# Patient Record
Sex: FEMALE | Race: WHITE | NOT HISPANIC OR LATINO | Employment: OTHER | URBAN - METROPOLITAN AREA
[De-identification: names, ages, dates, MRNs, and addresses within clinical notes are randomized per-mention and may not be internally consistent; named-entity substitution may affect disease eponyms.]

---

## 2017-10-04 ENCOUNTER — ESTABLISHED COMPREHENSIVE EXAM (OUTPATIENT)
Dept: URBAN - METROPOLITAN AREA CLINIC 43 | Facility: CLINIC | Age: 72
End: 2017-10-04

## 2017-10-04 DIAGNOSIS — H43.813: ICD-10-CM

## 2017-10-04 DIAGNOSIS — H04.123: ICD-10-CM

## 2017-10-04 PROCEDURE — 1036F TOBACCO NON-USER: CPT

## 2017-10-04 PROCEDURE — G8785 BP SCRN NO PERF AT INTERVAL: HCPCS

## 2017-10-04 PROCEDURE — G8427 DOCREV CUR MEDS BY ELIG CLIN: HCPCS

## 2017-10-04 PROCEDURE — 92014 COMPRE OPH EXAM EST PT 1/>: CPT

## 2017-10-04 PROCEDURE — 83861 MICROFLUID ANALY TEARS: CPT

## 2017-10-04 ASSESSMENT — VISUAL ACUITY
OD_CC: J2-
OS_BAT: 20/40
OS_CC: 20/30-1
OS_CC: J1
OD_SC: J12
OD_CC: 20/40-2
OS_SC: J12
OD_SC: 20/70-2
OD_BAT: 20/50
OS_SC: 20/60

## 2017-10-04 ASSESSMENT — TONOMETRY
OD_IOP_MMHG: 13
OS_IOP_MMHG: 13

## 2018-10-03 ENCOUNTER — ESTABLISHED COMPREHENSIVE EXAM (OUTPATIENT)
Dept: URBAN - METROPOLITAN AREA CLINIC 43 | Facility: CLINIC | Age: 73
End: 2018-10-03

## 2018-10-03 DIAGNOSIS — H04.123: ICD-10-CM

## 2018-10-03 DIAGNOSIS — H25.9: ICD-10-CM

## 2018-10-03 DIAGNOSIS — H43.813: ICD-10-CM

## 2018-10-03 PROCEDURE — 1036F TOBACCO NON-USER: CPT

## 2018-10-03 PROCEDURE — G9903 PT SCRN TBCO ID AS NON USER: HCPCS

## 2018-10-03 PROCEDURE — G8428 CUR MEDS NOT DOCUMENT: HCPCS

## 2018-10-03 PROCEDURE — 92014 COMPRE OPH EXAM EST PT 1/>: CPT

## 2018-10-03 PROCEDURE — G8785 BP SCRN NO PERF AT INTERVAL: HCPCS

## 2018-10-03 ASSESSMENT — TONOMETRY
OS_IOP_MMHG: 14
OD_IOP_MMHG: 13

## 2018-10-03 ASSESSMENT — VISUAL ACUITY
OD_CC: 20/50
OD_BAT: 20/200
OS_BAT: 20/200
OD_SC: 20/50-1
OS_CC: 20/40-1
OS_SC: 20/50+2

## 2019-10-03 ENCOUNTER — ESTABLISHED COMPREHENSIVE EXAM (OUTPATIENT)
Dept: URBAN - METROPOLITAN AREA CLINIC 43 | Facility: CLINIC | Age: 74
End: 2019-10-03

## 2019-10-03 DIAGNOSIS — H43.813: ICD-10-CM

## 2019-10-03 DIAGNOSIS — H04.123: ICD-10-CM

## 2019-10-03 DIAGNOSIS — H25.9: ICD-10-CM

## 2019-10-03 PROCEDURE — 92014 COMPRE OPH EXAM EST PT 1/>: CPT

## 2019-10-03 ASSESSMENT — VISUAL ACUITY
OS_CC: J1
OD_CC: J1
OD_CC: 20/50+1
OS_CC: 20/30-2

## 2019-10-03 ASSESSMENT — TONOMETRY
OD_IOP_MMHG: 12
OS_IOP_MMHG: 13

## 2020-10-06 ENCOUNTER — ESTABLISHED COMPREHENSIVE EXAM (OUTPATIENT)
Dept: URBAN - METROPOLITAN AREA CLINIC 43 | Facility: CLINIC | Age: 75
End: 2020-10-06

## 2020-10-06 DIAGNOSIS — H43.813: ICD-10-CM

## 2020-10-06 DIAGNOSIS — H25.9: ICD-10-CM

## 2020-10-06 DIAGNOSIS — H18.593: ICD-10-CM

## 2020-10-06 DIAGNOSIS — H04.123: ICD-10-CM

## 2020-10-06 PROCEDURE — 92015 DETERMINE REFRACTIVE STATE: CPT

## 2020-10-06 PROCEDURE — 92014 COMPRE OPH EXAM EST PT 1/>: CPT

## 2020-10-06 ASSESSMENT — VISUAL ACUITY
OD_SC: J10
OS_CC: 20/40
OS_SC: 20/50-2
OD_SC: 20/70
OS_SC: J12
OD_CC: 20/70
OS_BAT: 20/80-2
OD_PH: 20/40+1
OD_BAT: 20/400
OD_CC: J1
OS_CC: J1

## 2020-10-06 ASSESSMENT — TONOMETRY
OD_IOP_MMHG: 12
OS_IOP_MMHG: 13

## 2020-11-03 ENCOUNTER — CATARACT CONSULT (OUTPATIENT)
Dept: URBAN - METROPOLITAN AREA CLINIC 43 | Facility: CLINIC | Age: 75
End: 2020-11-03

## 2020-11-03 DIAGNOSIS — H18.593: ICD-10-CM

## 2020-11-03 DIAGNOSIS — H18.513: ICD-10-CM

## 2020-11-03 DIAGNOSIS — H04.123: ICD-10-CM

## 2020-11-03 DIAGNOSIS — H25.812: ICD-10-CM

## 2020-11-03 DIAGNOSIS — H25.811: ICD-10-CM

## 2020-11-03 DIAGNOSIS — H43.813: ICD-10-CM

## 2020-11-03 PROCEDURE — 92014 COMPRE OPH EXAM EST PT 1/>: CPT

## 2020-11-03 PROCEDURE — 92286 ANT SGM IMG I&R SPECLR MIC: CPT

## 2020-11-03 PROCEDURE — 92134 CPTRZ OPH DX IMG PST SGM RTA: CPT

## 2020-11-03 PROCEDURE — 92025-2 CORNEAL TOPOGRAPHY, PT

## 2020-11-03 PROCEDURE — 92136TC INTERFEROMETRY - TECHNICAL COMPONENT

## 2020-11-03 ASSESSMENT — VISUAL ACUITY
OD_SC: J10
OD_RAM: 20/20
OS_SC: 20/60-2
OD_CC: 20/60-2
OD_CC: J6
OS_BAT: 20/100
OS_AM: 20/20
OS_CC: 20/40-2
OD_BAT: 20/400
OD_SC: 20/80-1
OS_CC: J2
OS_SC: J12

## 2020-11-03 ASSESSMENT — TONOMETRY
OD_IOP_MMHG: 11
OS_IOP_MMHG: 12

## 2020-11-09 ENCOUNTER — CORNEA CONSULT (OUTPATIENT)
Dept: URBAN - METROPOLITAN AREA CLINIC 43 | Facility: CLINIC | Age: 75
End: 2020-11-09

## 2020-11-09 DIAGNOSIS — H18.593: ICD-10-CM

## 2020-11-09 DIAGNOSIS — H43.813: ICD-10-CM

## 2020-11-09 DIAGNOSIS — H25.811: ICD-10-CM

## 2020-11-09 DIAGNOSIS — H35.413: ICD-10-CM

## 2020-11-09 DIAGNOSIS — H04.123: ICD-10-CM

## 2020-11-09 DIAGNOSIS — H18.513: ICD-10-CM

## 2020-11-09 DIAGNOSIS — H25.812: ICD-10-CM

## 2020-11-09 PROCEDURE — 92250 FUNDUS PHOTOGRAPHY W/I&R: CPT

## 2020-11-09 PROCEDURE — 92014 COMPRE OPH EXAM EST PT 1/>: CPT

## 2020-11-09 PROCEDURE — 92025 CPTRIZED CORNEAL TOPOGRAPHY: CPT

## 2020-11-09 RX ORDER — AMOXICILLIN 500 MG/1: 1 CAPSULE ORAL

## 2020-11-09 ASSESSMENT — TONOMETRY
OD_IOP_MMHG: 12
OS_IOP_MMHG: 15

## 2020-11-09 ASSESSMENT — VISUAL ACUITY
OD_SC: 20/80-1
OD_CC: 20/50
OS_CC: 20/40-1
OS_SC: 20/60-1
OS_SC: J12
OD_CC: J2
OS_CC: J1
OD_SC: J10

## 2020-11-18 ENCOUNTER — SURGERY/PROCEDURE (OUTPATIENT)
Dept: URBAN - METROPOLITAN AREA CLINIC 43 | Facility: CLINIC | Age: 75
End: 2020-11-18

## 2020-11-18 ENCOUNTER — ESTABLISHED PATIENT (OUTPATIENT)
Dept: URBAN - METROPOLITAN AREA SURGERY 14 | Facility: SURGERY | Age: 75
End: 2020-11-18

## 2020-11-18 DIAGNOSIS — H18.593: ICD-10-CM

## 2020-11-18 DIAGNOSIS — H18.591: ICD-10-CM

## 2020-11-18 PROCEDURE — 65400 REMOVAL OF EYE LESION: CPT

## 2020-11-18 PROCEDURE — 99211HP H&P OFFICE/OUTPATIENT VISIT, EST

## 2020-11-18 PROCEDURE — 65435 CURETTE/TREAT CORNEA: CPT

## 2020-11-19 ENCOUNTER — 1 DAY POST-OP (OUTPATIENT)
Dept: URBAN - METROPOLITAN AREA CLINIC 43 | Facility: CLINIC | Age: 75
End: 2020-11-19

## 2020-11-19 DIAGNOSIS — Z98.890: ICD-10-CM

## 2020-11-19 DIAGNOSIS — H18.593: ICD-10-CM

## 2020-11-19 PROCEDURE — 66999PO NON CO-MANAGED OTHER SURGERY PO

## 2020-11-19 ASSESSMENT — VISUAL ACUITY
OD_SC: 20/200+1
OS_SC: 20/60-1

## 2020-11-24 ENCOUNTER — POST-OP (OUTPATIENT)
Dept: URBAN - METROPOLITAN AREA CLINIC 43 | Facility: CLINIC | Age: 75
End: 2020-11-24

## 2020-11-24 DIAGNOSIS — Z98.890: ICD-10-CM

## 2020-11-24 DIAGNOSIS — H18.593: ICD-10-CM

## 2020-11-24 PROCEDURE — 66999PO NON CO-MANAGED OTHER SURGERY PO

## 2020-11-24 ASSESSMENT — VISUAL ACUITY
OS_SC: 20/60-1
OD_SC: 20/200

## 2020-12-01 ENCOUNTER — FOLLOW UP (OUTPATIENT)
Dept: URBAN - METROPOLITAN AREA CLINIC 43 | Facility: CLINIC | Age: 75
End: 2020-12-01

## 2020-12-01 DIAGNOSIS — Z98.890: ICD-10-CM

## 2020-12-01 DIAGNOSIS — H18.593: ICD-10-CM

## 2020-12-01 PROCEDURE — 66999PO NON CO-MANAGED OTHER SURGERY PO

## 2020-12-01 ASSESSMENT — VISUAL ACUITY
OS_SC: 20/60+2
OD_SC: 20/200

## 2020-12-07 ENCOUNTER — FOLLOW UP (OUTPATIENT)
Dept: URBAN - METROPOLITAN AREA CLINIC 43 | Facility: CLINIC | Age: 75
End: 2020-12-07

## 2020-12-07 DIAGNOSIS — H18.593: ICD-10-CM

## 2020-12-07 DIAGNOSIS — Z98.890: ICD-10-CM

## 2020-12-07 PROCEDURE — 66999PO NON CO-MANAGED OTHER SURGERY PO

## 2020-12-07 ASSESSMENT — VISUAL ACUITY
OS_SC: 20/50-1
OD_SC: 20/100-1

## 2020-12-14 ENCOUNTER — POST-OP (OUTPATIENT)
Dept: URBAN - METROPOLITAN AREA CLINIC 43 | Facility: CLINIC | Age: 75
End: 2020-12-14

## 2020-12-14 DIAGNOSIS — Z98.890: ICD-10-CM

## 2020-12-14 DIAGNOSIS — H18.593: ICD-10-CM

## 2020-12-14 PROCEDURE — 66999PO NON CO-MANAGED OTHER SURGERY PO

## 2020-12-14 ASSESSMENT — VISUAL ACUITY
OS_SC: 20/50-2
OD_SC: 20/100+1

## 2021-01-04 ENCOUNTER — POST-OP (OUTPATIENT)
Dept: URBAN - METROPOLITAN AREA CLINIC 43 | Facility: CLINIC | Age: 76
End: 2021-01-04

## 2021-01-04 DIAGNOSIS — H18.593: ICD-10-CM

## 2021-01-04 DIAGNOSIS — Z98.890: ICD-10-CM

## 2021-01-04 PROCEDURE — 66999PO NON CO-MANAGED OTHER SURGERY PO

## 2021-01-04 ASSESSMENT — VISUAL ACUITY
OS_SC: 20/50-2
OD_SC: 20/100+1

## 2021-01-28 ENCOUNTER — ESTABLISHED PATIENT (OUTPATIENT)
Dept: URBAN - METROPOLITAN AREA SURGERY 14 | Facility: SURGERY | Age: 76
End: 2021-01-28

## 2021-01-28 ENCOUNTER — SURGERY/PROCEDURE (OUTPATIENT)
Dept: URBAN - METROPOLITAN AREA CLINIC 43 | Facility: CLINIC | Age: 76
End: 2021-01-28

## 2021-01-28 DIAGNOSIS — H18.593: ICD-10-CM

## 2021-01-28 DIAGNOSIS — H18.592: ICD-10-CM

## 2021-01-28 DIAGNOSIS — Z98.890: ICD-10-CM

## 2021-01-28 PROCEDURE — 65435 CURETTE/TREAT CORNEA: CPT

## 2021-01-28 PROCEDURE — 99211HP H&P OFFICE/OUTPATIENT VISIT, EST

## 2021-01-28 PROCEDURE — 65400 REMOVAL OF EYE LESION: CPT

## 2021-01-29 ENCOUNTER — POST-OP (OUTPATIENT)
Dept: URBAN - METROPOLITAN AREA CLINIC 43 | Facility: CLINIC | Age: 76
End: 2021-01-29

## 2021-01-29 DIAGNOSIS — H18.593: ICD-10-CM

## 2021-01-29 DIAGNOSIS — Z98.890: ICD-10-CM

## 2021-01-29 PROCEDURE — 66999PO NON CO-MANAGED OTHER SURGERY PO

## 2021-01-29 ASSESSMENT — VISUAL ACUITY
OD_SC: 20/80-1
OS_SC: 20/60-1

## 2021-02-03 ENCOUNTER — POST-OP (OUTPATIENT)
Dept: URBAN - METROPOLITAN AREA CLINIC 43 | Facility: CLINIC | Age: 76
End: 2021-02-03

## 2021-02-03 DIAGNOSIS — H18.593: ICD-10-CM

## 2021-02-03 DIAGNOSIS — Z98.890: ICD-10-CM

## 2021-02-03 PROCEDURE — 99024 POSTOP FOLLOW-UP VISIT: CPT

## 2021-02-03 RX ORDER — PREDNISOLONE ACETATE 10 MG/ML: 1 SUSPENSION/ DROPS OPHTHALMIC TWICE A DAY

## 2021-02-03 ASSESSMENT — VISUAL ACUITY
OS_SC: 20/70-1
OD_SC: 20/80-1

## 2021-02-19 ENCOUNTER — POST-OP (OUTPATIENT)
Dept: URBAN - METROPOLITAN AREA CLINIC 43 | Facility: CLINIC | Age: 76
End: 2021-02-19

## 2021-02-19 DIAGNOSIS — H18.593: ICD-10-CM

## 2021-02-19 DIAGNOSIS — Z98.890: ICD-10-CM

## 2021-02-19 PROCEDURE — 66999PO NON CO-MANAGED OTHER SURGERY PO

## 2021-02-19 ASSESSMENT — VISUAL ACUITY
OS_SC: 20/60
OD_SC: 20/80-1
OD_CC: 20/50
OS_CC: 20/50

## 2021-05-04 ENCOUNTER — CATARACT EVALUATION (OUTPATIENT)
Dept: URBAN - METROPOLITAN AREA CLINIC 43 | Facility: CLINIC | Age: 76
End: 2021-05-04

## 2021-05-04 DIAGNOSIS — H25.812: ICD-10-CM

## 2021-05-04 DIAGNOSIS — H25.811: ICD-10-CM

## 2021-05-04 DIAGNOSIS — H18.593: ICD-10-CM

## 2021-05-04 DIAGNOSIS — Z98.890: ICD-10-CM

## 2021-05-04 DIAGNOSIS — H35.413: ICD-10-CM

## 2021-05-04 PROCEDURE — 92134 CPTRZ OPH DX IMG PST SGM RTA: CPT

## 2021-05-04 PROCEDURE — 92015 DETERMINE REFRACTIVE STATE: CPT

## 2021-05-04 PROCEDURE — 92025 CPTRIZED CORNEAL TOPOGRAPHY: CPT

## 2021-05-04 PROCEDURE — 99214 OFFICE O/P EST MOD 30 MIN: CPT

## 2021-05-04 ASSESSMENT — VISUAL ACUITY
OS_CC: 20/60+2
OS_CC: J1
OS_AM: 20/20-1
OS_BAT: 20/400
OD_CC: J2
OD_CC: 20/60-2
OD_RAM: 20/20-2
OD_SC: J12
OD_SC: 20/50-1
OS_SC: J12
OS_SC: 20/40
OD_BAT: 20/100

## 2021-05-04 ASSESSMENT — TONOMETRY
OD_IOP_MMHG: 13
OS_IOP_MMHG: 14

## 2021-05-10 ENCOUNTER — FOLLOW UP (OUTPATIENT)
Dept: URBAN - METROPOLITAN AREA CLINIC 43 | Facility: CLINIC | Age: 76
End: 2021-05-10

## 2021-05-10 DIAGNOSIS — H04.123: ICD-10-CM

## 2021-05-10 DIAGNOSIS — H18.593: ICD-10-CM

## 2021-05-10 PROCEDURE — A4262 TEMPORARY TEAR DUCT PLUG: HCPCS

## 2021-05-10 PROCEDURE — 68761Q PUNCTAL PLUG/QUINTESS DISSOLVABLE PLUG/EACH

## 2021-05-10 PROCEDURE — 92012 INTRM OPH EXAM EST PATIENT: CPT

## 2021-05-10 ASSESSMENT — VISUAL ACUITY
OD_CC: 20/50+1
OS_CC: 20/50

## 2021-06-04 NOTE — PATIENT DISCUSSION
Recommended warm compresses with microwavable eye mask for 10 minutes and take 3,000 mg flaxseed oil by mouth daily. Home

## 2021-06-08 ENCOUNTER — FOLLOW UP (OUTPATIENT)
Dept: URBAN - METROPOLITAN AREA CLINIC 43 | Facility: CLINIC | Age: 76
End: 2021-06-08

## 2021-06-08 DIAGNOSIS — H18.593: ICD-10-CM

## 2021-06-08 DIAGNOSIS — H04.123: ICD-10-CM

## 2021-06-08 DIAGNOSIS — H25.812: ICD-10-CM

## 2021-06-08 DIAGNOSIS — H25.811: ICD-10-CM

## 2021-06-08 DIAGNOSIS — H18.513: ICD-10-CM

## 2021-06-08 PROCEDURE — 99214 OFFICE O/P EST MOD 30 MIN: CPT

## 2021-06-08 PROCEDURE — 92025-1 CORNEAL TOPOGRAPHY, INS

## 2021-06-08 PROCEDURE — 92136TC INTERFEROMETRY - TECHNICAL COMPONENT

## 2021-06-08 RX ORDER — NEPAFENAC 3 MG/ML: 1 SUSPENSION/ DROPS OPHTHALMIC ONCE A DAY

## 2021-06-08 RX ORDER — AMOXICILLIN 500 MG/1: 1 CAPSULE ORAL

## 2021-06-08 RX ORDER — DUREZOL 0.5 MG/ML: 1 EMULSION OPHTHALMIC TWICE A DAY

## 2021-06-08 ASSESSMENT — TONOMETRY
OD_IOP_MMHG: 12
OS_IOP_MMHG: 14

## 2021-06-08 ASSESSMENT — VISUAL ACUITY
OD_SC: 20/70-1
OD_BAT: 20/70
OD_CC: J6
OD_SC: J12
OS_SC: 20/40
OS_SC: J12
OD_CC: 20/40
OS_BAT: 20/50
OS_CC: J1
OS_AM: 20/20
OD_RAM: 20/25
OS_CC: 20/40

## 2021-06-22 ENCOUNTER — SURGERY/PROCEDURE (OUTPATIENT)
Dept: URBAN - METROPOLITAN AREA CLINIC 43 | Facility: CLINIC | Age: 76
End: 2021-06-22

## 2021-06-22 ENCOUNTER — POST-OP CATARACT (OUTPATIENT)
Dept: URBAN - METROPOLITAN AREA CLINIC 39 | Facility: CLINIC | Age: 76
End: 2021-06-22

## 2021-06-22 ENCOUNTER — PRE-OP/H&P (OUTPATIENT)
Dept: URBAN - METROPOLITAN AREA CLINIC 39 | Facility: CLINIC | Age: 76
End: 2021-06-22

## 2021-06-22 DIAGNOSIS — Z96.1: ICD-10-CM

## 2021-06-22 DIAGNOSIS — H25.811: ICD-10-CM

## 2021-06-22 PROCEDURE — 66984CV REMOVE CATARACT, INSERT LENS, CUSTOM VISION

## 2021-06-22 PROCEDURE — 99211T TECH SERVICE

## 2021-06-22 PROCEDURE — 66999LNSR LENSAR LASER FOR CAT SX

## 2021-06-22 PROCEDURE — 65772LRI LRI DURING CAT SX

## 2021-06-22 ASSESSMENT — TONOMETRY: OD_IOP_MMHG: 08

## 2021-06-22 ASSESSMENT — VISUAL ACUITY: OD_SC: 20/200

## 2021-06-23 ENCOUNTER — POST OP/EVAL OF SECOND EYE (OUTPATIENT)
Dept: URBAN - METROPOLITAN AREA CLINIC 43 | Facility: CLINIC | Age: 76
End: 2021-06-23

## 2021-06-23 DIAGNOSIS — Z96.1: ICD-10-CM

## 2021-06-23 DIAGNOSIS — H25.812: ICD-10-CM

## 2021-06-23 PROCEDURE — P6698455 NON-COMANAGED ADVANCED PO

## 2021-06-23 ASSESSMENT — VISUAL ACUITY
OD_SC: 20/200
OS_BAT: 20/50
OD_SC: J12
OD_PH: 20/100
OS_SC: J10
OS_PH: 20/30-1
OS_SC: 20/40

## 2021-06-23 ASSESSMENT — TONOMETRY
OS_IOP_MMHG: 10
OD_IOP_MMHG: 10

## 2021-06-28 ENCOUNTER — POST OP/EVAL OF SECOND EYE (OUTPATIENT)
Dept: URBAN - METROPOLITAN AREA CLINIC 43 | Facility: CLINIC | Age: 76
End: 2021-06-28

## 2021-06-28 DIAGNOSIS — H25.812: ICD-10-CM

## 2021-06-28 DIAGNOSIS — H16.101: ICD-10-CM

## 2021-06-28 DIAGNOSIS — Z96.1: ICD-10-CM

## 2021-06-28 PROCEDURE — 99024 POSTOP FOLLOW-UP VISIT: CPT

## 2021-06-28 ASSESSMENT — VISUAL ACUITY
OD_SC: 20/70-1
OS_SC: 20/40

## 2021-07-01 ENCOUNTER — POST OP/EVAL OF SECOND EYE (OUTPATIENT)
Dept: URBAN - METROPOLITAN AREA CLINIC 43 | Facility: CLINIC | Age: 76
End: 2021-07-01

## 2021-07-01 DIAGNOSIS — Z96.1: ICD-10-CM

## 2021-07-01 DIAGNOSIS — H25.812: ICD-10-CM

## 2021-07-01 PROCEDURE — 99024 POSTOP FOLLOW-UP VISIT: CPT

## 2021-07-01 ASSESSMENT — VISUAL ACUITY
OD_SC: 20/200-1
OS_SC: 20/40-1

## 2021-07-01 ASSESSMENT — TONOMETRY
OD_IOP_MMHG: 11
OS_IOP_MMHG: 10

## 2021-07-08 ENCOUNTER — POST-OP (OUTPATIENT)
Dept: URBAN - METROPOLITAN AREA CLINIC 43 | Facility: CLINIC | Age: 76
End: 2021-07-08

## 2021-07-08 DIAGNOSIS — Z96.1: ICD-10-CM

## 2021-07-08 DIAGNOSIS — H25.812: ICD-10-CM

## 2021-07-08 PROCEDURE — 99024 POSTOP FOLLOW-UP VISIT: CPT

## 2021-07-08 ASSESSMENT — TONOMETRY
OS_IOP_MMHG: 13
OD_IOP_MMHG: 13

## 2021-07-08 ASSESSMENT — VISUAL ACUITY
OS_SC: J8-
OD_SC: 20/80-1
OS_BAT: 20/50
OD_SC: J3-
OS_SC: 20/40-1

## 2021-07-26 ENCOUNTER — POST-OP CATARACT (OUTPATIENT)
Dept: URBAN - METROPOLITAN AREA CLINIC 43 | Facility: CLINIC | Age: 76
End: 2021-07-26

## 2021-07-26 DIAGNOSIS — H25.812: ICD-10-CM

## 2021-07-26 DIAGNOSIS — Z96.1: ICD-10-CM

## 2021-07-26 PROCEDURE — 99024 POSTOP FOLLOW-UP VISIT: CPT

## 2021-07-26 ASSESSMENT — VISUAL ACUITY
OD_SC: J6-
OS_SC: J12
OS_SC: 20/40-1
OS_BAT: 20/50
OD_SC: 20/60

## 2021-07-26 ASSESSMENT — TONOMETRY
OD_IOP_MMHG: 13
OS_IOP_MMHG: 13

## 2021-08-26 ENCOUNTER — POST-OP (OUTPATIENT)
Dept: URBAN - METROPOLITAN AREA CLINIC 43 | Facility: CLINIC | Age: 76
End: 2021-08-26

## 2021-08-26 DIAGNOSIS — H04.123: ICD-10-CM

## 2021-08-26 DIAGNOSIS — Z96.1: ICD-10-CM

## 2021-08-26 PROCEDURE — 68761Q PUNCTAL PLUG/QUINTESS DISSOLVABLE PLUG/EACH

## 2021-08-26 PROCEDURE — 99024 POSTOP FOLLOW-UP VISIT: CPT

## 2021-08-26 PROCEDURE — A4262 TEMPORARY TEAR DUCT PLUG: HCPCS

## 2021-08-26 ASSESSMENT — TONOMETRY
OS_IOP_MMHG: 13
OD_IOP_MMHG: 10

## 2021-08-26 ASSESSMENT — VISUAL ACUITY
OD_SC: J4-
OS_SC: 20/40
OD_PH: 20/50-2
OS_SC: J10
OD_SC: 20/80+1

## 2021-09-23 ENCOUNTER — YAG EVALUATION (OUTPATIENT)
Dept: URBAN - METROPOLITAN AREA CLINIC 39 | Facility: CLINIC | Age: 76
End: 2021-09-23

## 2021-09-23 ENCOUNTER — SURGERY/PROCEDURE (OUTPATIENT)
Dept: URBAN - METROPOLITAN AREA SURGERY 14 | Facility: SURGERY | Age: 76
End: 2021-09-23

## 2021-09-23 DIAGNOSIS — H26.491: ICD-10-CM

## 2021-09-23 DIAGNOSIS — Z96.1: ICD-10-CM

## 2021-09-23 DIAGNOSIS — H25.812: ICD-10-CM

## 2021-09-23 PROCEDURE — 92014 COMPRE OPH EXAM EST PT 1/>: CPT

## 2021-09-23 PROCEDURE — 66821 AFTER CATARACT LASER SURGERY: CPT

## 2021-09-23 ASSESSMENT — VISUAL ACUITY
OS_SC: 20/40
OD_SC: 20/60

## 2021-09-27 ENCOUNTER — YAG POST-OP (OUTPATIENT)
Dept: URBAN - METROPOLITAN AREA CLINIC 43 | Facility: CLINIC | Age: 76
End: 2021-09-27

## 2021-09-27 DIAGNOSIS — Z98.890: ICD-10-CM

## 2021-09-27 DIAGNOSIS — H04.123: ICD-10-CM

## 2021-09-27 PROCEDURE — 68761S PUNCTUM PLUG / SILICONE,EACH

## 2021-09-27 PROCEDURE — 99024 POSTOP FOLLOW-UP VISIT: CPT

## 2021-09-27 PROCEDURE — A4263 PERMANENT TEAR DUCT PLUG: HCPCS

## 2021-09-27 ASSESSMENT — TONOMETRY
OD_IOP_MMHG: 10
OS_IOP_MMHG: 12

## 2021-09-27 ASSESSMENT — VISUAL ACUITY
OS_SC: 20/40
OD_SC: 20/80

## 2021-11-01 ENCOUNTER — FOLLOW UP (OUTPATIENT)
Dept: URBAN - METROPOLITAN AREA CLINIC 43 | Facility: CLINIC | Age: 76
End: 2021-11-01

## 2021-11-01 DIAGNOSIS — Z98.890: ICD-10-CM

## 2021-11-01 PROCEDURE — 66999PO NON CO-MANAGED OTHER SURGERY PO

## 2021-11-01 ASSESSMENT — VISUAL ACUITY
OD_SC: J2-
OD_SC: 20/50-1
OS_SC: J10
OS_SC: 20/40

## 2021-11-01 ASSESSMENT — TONOMETRY
OD_IOP_MMHG: 11
OS_IOP_MMHG: 14

## 2022-01-13 ENCOUNTER — COMPREHENSIVE EXAM (OUTPATIENT)
Dept: URBAN - METROPOLITAN AREA CLINIC 43 | Facility: CLINIC | Age: 77
End: 2022-01-13

## 2022-01-13 DIAGNOSIS — H04.123: ICD-10-CM

## 2022-01-13 DIAGNOSIS — H16.101: ICD-10-CM

## 2022-01-13 PROCEDURE — 92014 COMPRE OPH EXAM EST PT 1/>: CPT

## 2022-01-13 PROCEDURE — 92015 DETERMINE REFRACTIVE STATE: CPT

## 2022-01-13 ASSESSMENT — VISUAL ACUITY
OS_CC: 20/50-2
OS_SC: 20/40-1
OD_SC: J3
OD_CC: J1
OS_BAT: 20/400
OS_SC: J10
OS_CC: J1
OD_SC: 20/60-1

## 2022-01-13 ASSESSMENT — TONOMETRY
OD_IOP_MMHG: 12
OS_IOP_MMHG: 14

## 2022-03-01 ENCOUNTER — CONSULTATION/EVALUATION (OUTPATIENT)
Dept: URBAN - METROPOLITAN AREA CLINIC 43 | Facility: CLINIC | Age: 77
End: 2022-03-01

## 2022-03-01 DIAGNOSIS — H35.413: ICD-10-CM

## 2022-03-01 DIAGNOSIS — H04.123: ICD-10-CM

## 2022-03-01 DIAGNOSIS — H18.593: ICD-10-CM

## 2022-03-01 DIAGNOSIS — H43.813: ICD-10-CM

## 2022-03-01 DIAGNOSIS — Z98.890: ICD-10-CM

## 2022-03-01 DIAGNOSIS — H25.812: ICD-10-CM

## 2022-03-01 DIAGNOSIS — H18.513: ICD-10-CM

## 2022-03-01 PROCEDURE — 92025CV CORNEAL TOPOGRAPHY, CV

## 2022-03-01 PROCEDURE — 92012 INTRM OPH EXAM EST PATIENT: CPT

## 2022-03-01 PROCEDURE — 92134 CPTRZ OPH DX IMG PST SGM RTA: CPT

## 2022-03-01 PROCEDURE — 92286 ANT SGM IMG I&R SPECLR MIC: CPT

## 2022-03-01 PROCEDURE — 92136TC INTERFEROMETRY - TECHNICAL COMPONENT

## 2022-03-01 ASSESSMENT — TONOMETRY
OS_IOP_MMHG: 15
OD_IOP_MMHG: 13

## 2022-03-01 ASSESSMENT — VISUAL ACUITY
OS_BAT: 20/200
OS_SC: 20/40
OD_SC: 20/70-2
OS_CC: J2
OD_SC: J2
OS_AM: 20/20
OS_SC: J12
OD_CC: J2

## 2022-03-14 ENCOUNTER — FOLLOW UP (OUTPATIENT)
Dept: URBAN - METROPOLITAN AREA CLINIC 39 | Facility: CLINIC | Age: 77
End: 2022-03-14

## 2022-03-14 DIAGNOSIS — H04.123: ICD-10-CM

## 2022-03-14 DIAGNOSIS — H25.812: ICD-10-CM

## 2022-03-14 DIAGNOSIS — Z96.1: ICD-10-CM

## 2022-03-14 DIAGNOSIS — H18.513: ICD-10-CM

## 2022-03-14 PROCEDURE — 92136TC INTERFEROMETRY - TECHNICAL COMPONENT

## 2022-03-14 PROCEDURE — 92012 INTRM OPH EXAM EST PATIENT: CPT

## 2022-03-14 PROCEDURE — V2799PMN IMPRIMIS PRED-MOXI-NEPAF 5ML

## 2022-03-14 RX ORDER — PREDNISOLONE ACETATE 10 MG/ML: 1 SUSPENSION/ DROPS OPHTHALMIC

## 2022-03-14 RX ORDER — AMOXICILLIN 500 MG/1: 1 CAPSULE ORAL

## 2022-03-14 RX ORDER — NEPAFENAC 3 MG/ML: 1 SUSPENSION/ DROPS OPHTHALMIC ONCE A DAY

## 2022-03-14 ASSESSMENT — VISUAL ACUITY
OS_AM: 20/20
OD_SC: 20/40
OS_BAT: 20/200
OS_SC: J3
OS_SC: 20/40
OD_SC: J3

## 2022-04-11 ENCOUNTER — SURGERY/PROCEDURE (OUTPATIENT)
Dept: URBAN - METROPOLITAN AREA CLINIC 43 | Facility: CLINIC | Age: 77
End: 2022-04-11

## 2022-04-11 ENCOUNTER — PRE-OP/H&P (OUTPATIENT)
Dept: URBAN - METROPOLITAN AREA CLINIC 39 | Facility: CLINIC | Age: 77
End: 2022-04-11

## 2022-04-11 DIAGNOSIS — H25.812: ICD-10-CM

## 2022-04-11 DIAGNOSIS — H16.101: ICD-10-CM

## 2022-04-11 DIAGNOSIS — H01.023: ICD-10-CM

## 2022-04-11 DIAGNOSIS — H01.026: ICD-10-CM

## 2022-04-11 PROCEDURE — 65772LRI LRI DURING CAT SX

## 2022-04-11 PROCEDURE — 66984CV REMOVE CATARACT, INSERT LENS, CUSTOM VISION

## 2022-04-11 PROCEDURE — 99211T TECH SERVICE

## 2022-04-11 PROCEDURE — 66999LNSR LENSAR LASER FOR CAT SX

## 2022-04-12 ENCOUNTER — POST-OP (OUTPATIENT)
Dept: URBAN - METROPOLITAN AREA CLINIC 43 | Facility: CLINIC | Age: 77
End: 2022-04-12

## 2022-04-12 DIAGNOSIS — H01.026: ICD-10-CM

## 2022-04-12 DIAGNOSIS — H16.101: ICD-10-CM

## 2022-04-12 DIAGNOSIS — H01.023: ICD-10-CM

## 2022-04-12 DIAGNOSIS — Z96.1: ICD-10-CM

## 2022-04-12 PROCEDURE — 99024 POSTOP FOLLOW-UP VISIT: CPT

## 2022-04-12 ASSESSMENT — VISUAL ACUITY
OD_SC: 20/50-2
OS_SC: 20/40-1

## 2022-04-12 ASSESSMENT — TONOMETRY
OD_IOP_MMHG: 13
OS_IOP_MMHG: 13

## 2022-04-27 ENCOUNTER — POST-OP (OUTPATIENT)
Dept: URBAN - METROPOLITAN AREA CLINIC 43 | Facility: CLINIC | Age: 77
End: 2022-04-27

## 2022-04-27 DIAGNOSIS — Z96.1: ICD-10-CM

## 2022-04-27 DIAGNOSIS — H01.023: ICD-10-CM

## 2022-04-27 DIAGNOSIS — H16.101: ICD-10-CM

## 2022-04-27 DIAGNOSIS — H01.026: ICD-10-CM

## 2022-04-27 PROCEDURE — 99024 POSTOP FOLLOW-UP VISIT: CPT

## 2022-04-27 ASSESSMENT — VISUAL ACUITY
OD_SC: 20/60-2
OD_SC: J2-
OS_SC: 20/30-2
OS_SC: J6-

## 2022-04-27 ASSESSMENT — TONOMETRY
OS_IOP_MMHG: 12
OD_IOP_MMHG: 11

## 2022-08-26 ENCOUNTER — EMERGENCY VISIT (OUTPATIENT)
Dept: URBAN - METROPOLITAN AREA CLINIC 43 | Facility: CLINIC | Age: 77
End: 2022-08-26

## 2022-08-26 DIAGNOSIS — Z96.1: ICD-10-CM

## 2022-08-26 DIAGNOSIS — H35.413: ICD-10-CM

## 2022-08-26 DIAGNOSIS — H16.101: ICD-10-CM

## 2022-08-26 DIAGNOSIS — H53.10: ICD-10-CM

## 2022-08-26 PROCEDURE — 92014 COMPRE OPH EXAM EST PT 1/>: CPT

## 2022-08-26 PROCEDURE — 92250 FUNDUS PHOTOGRAPHY W/I&R: CPT

## 2022-08-26 ASSESSMENT — TONOMETRY
OD_IOP_MMHG: 12
OS_IOP_MMHG: 11

## 2022-08-26 ASSESSMENT — VISUAL ACUITY
OD_SC: 20/40-2
OS_SC: 20/30-2

## 2022-09-20 ENCOUNTER — COMPREHENSIVE EXAM (OUTPATIENT)
Dept: URBAN - METROPOLITAN AREA CLINIC 43 | Facility: CLINIC | Age: 77
End: 2022-09-20

## 2022-09-20 DIAGNOSIS — H26.492: ICD-10-CM

## 2022-09-20 DIAGNOSIS — H43.813: ICD-10-CM

## 2022-09-20 DIAGNOSIS — H16.101: ICD-10-CM

## 2022-09-20 DIAGNOSIS — H01.026: ICD-10-CM

## 2022-09-20 DIAGNOSIS — Z96.1: ICD-10-CM

## 2022-09-20 DIAGNOSIS — H01.023: ICD-10-CM

## 2022-09-20 DIAGNOSIS — H53.10: ICD-10-CM

## 2022-09-20 DIAGNOSIS — H18.513: ICD-10-CM

## 2022-09-20 DIAGNOSIS — H35.413: ICD-10-CM

## 2022-09-20 DIAGNOSIS — H04.123: ICD-10-CM

## 2022-09-20 DIAGNOSIS — H18.593: ICD-10-CM

## 2022-09-20 PROCEDURE — 92014 COMPRE OPH EXAM EST PT 1/>: CPT

## 2022-09-20 PROCEDURE — 92015 DETERMINE REFRACTIVE STATE: CPT

## 2022-09-20 ASSESSMENT — VISUAL ACUITY
OD_SC: 20/80+2
OS_CC: J1
OD_PH: 20/40-2
OS_SC: J8
OD_SC: J4
OS_PH: 20/30-2
OD_CC: J1
OS_SC: 20/40+1
OU_SC: 20/40+1

## 2022-09-20 ASSESSMENT — TONOMETRY
OD_IOP_MMHG: 12
OS_IOP_MMHG: 12

## 2022-10-31 ENCOUNTER — APPOINTMENT (RX ONLY)
Dept: URBAN - METROPOLITAN AREA CLINIC 137 | Facility: CLINIC | Age: 77
Setting detail: DERMATOLOGY
End: 2022-10-31

## 2022-10-31 DIAGNOSIS — L82.1 OTHER SEBORRHEIC KERATOSIS: ICD-10-CM | Status: UNCHANGED

## 2022-10-31 DIAGNOSIS — Z71.89 OTHER SPECIFIED COUNSELING: ICD-10-CM

## 2022-10-31 DIAGNOSIS — D18.0 HEMANGIOMA: ICD-10-CM | Status: UNCHANGED

## 2022-10-31 DIAGNOSIS — L72.0 EPIDERMAL CYST: ICD-10-CM

## 2022-10-31 DIAGNOSIS — D485 NEOPLASM OF UNCERTAIN BEHAVIOR OF SKIN: ICD-10-CM

## 2022-10-31 DIAGNOSIS — D22 MELANOCYTIC NEVI: ICD-10-CM

## 2022-10-31 DIAGNOSIS — L57.8 OTHER SKIN CHANGES DUE TO CHRONIC EXPOSURE TO NONIONIZING RADIATION: ICD-10-CM | Status: UNCHANGED

## 2022-10-31 DIAGNOSIS — I83.9 ASYMPTOMATIC VARICOSE VEINS OF LOWER EXTREMITIES: ICD-10-CM

## 2022-10-31 PROBLEM — D48.5 NEOPLASM OF UNCERTAIN BEHAVIOR OF SKIN: Status: ACTIVE | Noted: 2022-10-31

## 2022-10-31 PROBLEM — D18.01 HEMANGIOMA OF SKIN AND SUBCUTANEOUS TISSUE: Status: ACTIVE | Noted: 2022-10-31

## 2022-10-31 PROBLEM — D22.71 MELANOCYTIC NEVI OF RIGHT LOWER LIMB, INCLUDING HIP: Status: ACTIVE | Noted: 2022-10-31

## 2022-10-31 PROBLEM — I83.93 ASYMPTOMATIC VARICOSE VEINS OF BILATERAL LOWER EXTREMITIES: Status: ACTIVE | Noted: 2022-10-31

## 2022-10-31 PROCEDURE — ? BIOPSY BY SHAVE METHOD

## 2022-10-31 PROCEDURE — 10040 EXTRACTION: CPT

## 2022-10-31 PROCEDURE — ? COUNSELING

## 2022-10-31 PROCEDURE — ? SUNSCREEN RECOMMENDATIONS

## 2022-10-31 PROCEDURE — 11102 TANGNTL BX SKIN SINGLE LES: CPT

## 2022-10-31 PROCEDURE — 99213 OFFICE O/P EST LOW 20 MIN: CPT | Mod: 25

## 2022-10-31 PROCEDURE — ? ACNE SURGERY (MULTIPLE LESIONS)

## 2022-10-31 ASSESSMENT — LOCATION DETAILED DESCRIPTION DERM
LOCATION DETAILED: RIGHT ANTERIOR DISTAL THIGH
LOCATION DETAILED: PERIUMBILICAL SKIN
LOCATION DETAILED: RIGHT CENTRAL PARIETAL SCALP
LOCATION DETAILED: RIGHT SUPERIOR UPPER BACK
LOCATION DETAILED: LEFT LOWER CUTANEOUS LIP
LOCATION DETAILED: LEFT MEDIAL SUPERIOR CHEST
LOCATION DETAILED: RIGHT PLANTAR FOREFOOT OVERLYING 2ND METATARSAL
LOCATION DETAILED: LEFT PROXIMAL PRETIBIAL REGION
LOCATION DETAILED: RIGHT DISTAL PRETIBIAL REGION
LOCATION DETAILED: INFERIOR THORACIC SPINE
LOCATION DETAILED: LEFT ANTERIOR PROXIMAL THIGH
LOCATION DETAILED: LEFT LOWER CUTANEOUS LIP
LOCATION DETAILED: PHILTRUM

## 2022-10-31 ASSESSMENT — LOCATION ZONE DERM
LOCATION ZONE: LEG
LOCATION ZONE: TRUNK
LOCATION ZONE: LIP
LOCATION ZONE: LIP
LOCATION ZONE: SCALP
LOCATION ZONE: FEET

## 2022-10-31 ASSESSMENT — LOCATION SIMPLE DESCRIPTION DERM
LOCATION SIMPLE: UPPER BACK
LOCATION SIMPLE: SCALP
LOCATION SIMPLE: RIGHT THIGH
LOCATION SIMPLE: LEFT LIP
LOCATION SIMPLE: ABDOMEN
LOCATION SIMPLE: RIGHT PLANTAR SURFACE
LOCATION SIMPLE: LEFT PRETIBIAL REGION
LOCATION SIMPLE: UPPER LIP
LOCATION SIMPLE: RIGHT UPPER BACK
LOCATION SIMPLE: RIGHT PRETIBIAL REGION
LOCATION SIMPLE: CHEST
LOCATION SIMPLE: LEFT LIP
LOCATION SIMPLE: LEFT THIGH

## 2022-10-31 NOTE — PROCEDURE: BIOPSY BY SHAVE METHOD
Body Location Override (Optional - Billing Will Still Be Based On Selected Body Map Location If Applicable): mid upper lip
Detail Level: Detailed
Depth Of Biopsy: dermis
Was A Bandage Applied: Yes
Size Of Lesion In Cm: 0.2
X Size Of Lesion In Cm: 0
Biopsy Type: H and E
Biopsy Method: Dermablade
Anesthesia Type: 1% lidocaine with epinephrine
Anesthesia Volume In Cc (Will Not Render If 0): 0.5
Hemostasis: Monsel's and Electrocautery
Wound Care: Petrolatum
Dressing: Band-Aid
Destruction After The Procedure: No
Type Of Destruction Used: Curettage
Curettage Text: The wound bed was treated with curettage after the biopsy was performed.
Cryotherapy Text: The wound bed was treated with cryotherapy after the biopsy was performed.
Electrodesiccation Text: The wound bed was treated with electrodesiccation after the biopsy was performed.
Electrodesiccation And Curettage Text: The wound bed was treated with electrodesiccation and curettage after the biopsy was performed.
Silver Nitrate Text: The wound bed was treated with silver nitrate after the biopsy was performed.
Lab: -[id number]
Consent: Written consent was obtained and risks were reviewed including but not limited to scarring, infection, bleeding, scabbing, incomplete removal, nerve damage and allergy to anesthesia.
Post-Care Instructions: I reviewed with the patient in detail post-care instructions. Patient is to keep the biopsy site dry overnight, and then apply bacitracin twice daily until healed. Patient may apply hydrogen peroxide soaks to remove any crusting.
Notification Instructions: Patient will be notified of biopsy results. However, patient instructed to call the office if not contacted within 2 weeks.
Billing Type: [employer]
Information: Selecting Yes will display possible errors in your note based on the variables you have selected. This validation is only offered as a suggestion for you. PLEASE NOTE THAT THE VALIDATION TEXT WILL BE REMOVED WHEN YOU FINALIZE YOUR NOTE. IF YOU WANT TO FAX A PRELIMINARY NOTE YOU WILL NEED TO TOGGLE THIS TO 'NO' IF YOU DO NOT WANT IT IN YOUR FAXED NOTE.

## 2022-10-31 NOTE — PROCEDURE: ACNE SURGERY (MULTIPLE LESIONS)
Render Number Of Lesions Treated: no
Prep Text (Optional): Prior to removal the treatment areas were prepped in the usual fashion.
Consent was obtained and risks were reviewed including but not limited to scarring, infection, bleeding, scabbing, incomplete removal, and allergy to anesthesia.
Total Number Of Lesions Treated: 5
Extraction Method: 11 blade and comedone extractor
Post-Care Instructions: I reviewed with the patient in detail post-care instructions. Patient is to keep the treatment areas dry overnight, and then apply bacitracin twice daily until healed. Patient may apply hydrogen peroxide soaks to remove any crusting.
Acne Type: Comedonal Lesions
Detail Level: Detailed

## 2022-10-31 NOTE — HPI: PREVENTATIVE SKIN CHECK
What Is The Reason For Today's Visit?: Full Body Skin Examination
Additional History: Areas of concern right lower leg

## 2022-11-01 ENCOUNTER — CONTACT LENSES/GLASSES VISIT (OUTPATIENT)
Dept: URBAN - METROPOLITAN AREA CLINIC 43 | Facility: CLINIC | Age: 77
End: 2022-11-01

## 2022-11-01 DIAGNOSIS — H04.123: ICD-10-CM

## 2022-11-01 DIAGNOSIS — H16.101: ICD-10-CM

## 2022-11-01 DIAGNOSIS — H18.513: ICD-10-CM

## 2022-11-01 DIAGNOSIS — H01.026: ICD-10-CM

## 2022-11-01 DIAGNOSIS — H35.413: ICD-10-CM

## 2022-11-01 DIAGNOSIS — H53.10: ICD-10-CM

## 2022-11-01 DIAGNOSIS — H18.593: ICD-10-CM

## 2022-11-01 DIAGNOSIS — H26.492: ICD-10-CM

## 2022-11-01 DIAGNOSIS — H43.813: ICD-10-CM

## 2022-11-01 DIAGNOSIS — H01.023: ICD-10-CM

## 2022-11-01 PROCEDURE — 92015GRNC REFRACTION GLASSES RECHECK - NO CHARGE

## 2022-11-01 ASSESSMENT — VISUAL ACUITY
OD_CC: 20/50
OS_CC: J4
OS_CC: 20/40
OD_CC: J6

## 2023-10-05 ENCOUNTER — COMPREHENSIVE EXAM (OUTPATIENT)
Dept: URBAN - METROPOLITAN AREA CLINIC 43 | Facility: CLINIC | Age: 78
End: 2023-10-05

## 2023-10-05 DIAGNOSIS — H04.123: ICD-10-CM

## 2023-10-05 DIAGNOSIS — Z96.1: ICD-10-CM

## 2023-10-05 DIAGNOSIS — H26.492: ICD-10-CM

## 2023-10-05 DIAGNOSIS — H43.813: ICD-10-CM

## 2023-10-05 DIAGNOSIS — H53.10: ICD-10-CM

## 2023-10-05 DIAGNOSIS — H35.413: ICD-10-CM

## 2023-10-05 DIAGNOSIS — H18.593: ICD-10-CM

## 2023-10-05 DIAGNOSIS — H18.513: ICD-10-CM

## 2023-10-05 PROCEDURE — 92015 DETERMINE REFRACTIVE STATE: CPT

## 2023-10-05 PROCEDURE — 92014 COMPRE OPH EXAM EST PT 1/>: CPT

## 2023-10-05 ASSESSMENT — VISUAL ACUITY
OS_SC: J10
OD_CC: 20/40-1
OS_CC: J1
OD_SC: J2
OS_SC: 20/25-2
OD_CC: J1
OS_CC: 20/20-2
OD_SC: 20/50-2

## 2023-10-05 ASSESSMENT — TONOMETRY
OD_IOP_MMHG: 13
OS_IOP_MMHG: 11

## 2023-11-01 ENCOUNTER — APPOINTMENT (RX ONLY)
Dept: URBAN - METROPOLITAN AREA CLINIC 137 | Facility: CLINIC | Age: 78
Setting detail: DERMATOLOGY
End: 2023-11-01

## 2023-11-01 DIAGNOSIS — D18.0 HEMANGIOMA: ICD-10-CM | Status: UNCHANGED

## 2023-11-01 DIAGNOSIS — D49.2 NEOPLASM OF UNSPECIFIED BEHAVIOR OF BONE, SOFT TISSUE, AND SKIN: ICD-10-CM

## 2023-11-01 DIAGNOSIS — L57.8 OTHER SKIN CHANGES DUE TO CHRONIC EXPOSURE TO NONIONIZING RADIATION: ICD-10-CM | Status: UNCHANGED

## 2023-11-01 DIAGNOSIS — L82.1 OTHER SEBORRHEIC KERATOSIS: ICD-10-CM | Status: UNCHANGED

## 2023-11-01 DIAGNOSIS — Z71.89 OTHER SPECIFIED COUNSELING: ICD-10-CM

## 2023-11-01 PROBLEM — D18.01 HEMANGIOMA OF SKIN AND SUBCUTANEOUS TISSUE: Status: ACTIVE | Noted: 2023-11-01

## 2023-11-01 PROCEDURE — 99213 OFFICE O/P EST LOW 20 MIN: CPT | Mod: 25

## 2023-11-01 PROCEDURE — ? PHOTO-DOCUMENTATION

## 2023-11-01 PROCEDURE — ? POINTED OUT BY PATIENT

## 2023-11-01 PROCEDURE — ? COUNSELING

## 2023-11-01 PROCEDURE — 11102 TANGNTL BX SKIN SINGLE LES: CPT | Mod: 59

## 2023-11-01 PROCEDURE — ? SUNSCREEN RECOMMENDATIONS

## 2023-11-01 PROCEDURE — 40490 BIOPSY OF LIP: CPT

## 2023-11-01 PROCEDURE — ? BIOPSY BY SHAVE METHOD

## 2023-11-01 ASSESSMENT — LOCATION SIMPLE DESCRIPTION DERM
LOCATION SIMPLE: CHEST
LOCATION SIMPLE: LEFT UPPER LIP
LOCATION SIMPLE: RIGHT UPPER BACK
LOCATION SIMPLE: UPPER BACK
LOCATION SIMPLE: RIGHT PRETIBIAL REGION
LOCATION SIMPLE: ABDOMEN

## 2023-11-01 ASSESSMENT — LOCATION ZONE DERM
LOCATION ZONE: LIP
LOCATION ZONE: LEG
LOCATION ZONE: TRUNK

## 2023-11-01 ASSESSMENT — LOCATION DETAILED DESCRIPTION DERM
LOCATION DETAILED: INFERIOR THORACIC SPINE
LOCATION DETAILED: RIGHT SUPERIOR UPPER BACK
LOCATION DETAILED: PERIUMBILICAL SKIN
LOCATION DETAILED: RIGHT DISTAL PRETIBIAL REGION
LOCATION DETAILED: LEFT MEDIAL SUPERIOR CHEST
LOCATION DETAILED: LEFT SUPERIOR VERMILION BORDER

## 2023-11-01 NOTE — PROCEDURE: BIOPSY BY SHAVE METHOD
Detail Level: Detailed
Depth Of Biopsy: dermis
Was A Bandage Applied: Yes
Size Of Lesion In Cm: 0
Biopsy Type: H and E
Biopsy Method: Dermablade
Anesthesia Type: 1% lidocaine with epinephrine
Anesthesia Volume In Cc (Will Not Render If 0): 0.5
Hemostasis: Monsel's and Electrocautery
Wound Care: Petrolatum
Dressing: Band-Aid
Destruction After The Procedure: No
Type Of Destruction Used: Curettage
Curettage Text: The wound bed was treated with curettage after the biopsy was performed.
Cryotherapy Text: The wound bed was treated with cryotherapy after the biopsy was performed.
Electrodesiccation Text: The wound bed was treated with electrodesiccation after the biopsy was performed.
Electrodesiccation And Curettage Text: The wound bed was treated with electrodesiccation and curettage after the biopsy was performed.
Silver Nitrate Text: The wound bed was treated with silver nitrate after the biopsy was performed.
Lab: -[id number]
Consent: Written consent was obtained and risks were reviewed including but not limited to scarring, infection, bleeding, scabbing, incomplete removal, nerve damage and allergy to anesthesia.
Post-Care Instructions: I reviewed with the patient in detail post-care instructions. Patient is to keep the biopsy site dry overnight, and then apply bacitracin twice daily until healed. Patient may apply hydrogen peroxide soaks to remove any crusting.
Notification Instructions: Patient will be notified of biopsy results. However, patient instructed to call the office if not contacted within 2 weeks.
Billing Type: [employer]
Information: Selecting Yes will display possible errors in your note based on the variables you have selected. This validation is only offered as a suggestion for you. PLEASE NOTE THAT THE VALIDATION TEXT WILL BE REMOVED WHEN YOU FINALIZE YOUR NOTE. IF YOU WANT TO FAX A PRELIMINARY NOTE YOU WILL NEED TO TOGGLE THIS TO 'NO' IF YOU DO NOT WANT IT IN YOUR FAXED NOTE.
Lab: -[id number]
Billing Type: Third-Party Bill

## 2023-11-13 ENCOUNTER — APPOINTMENT (RX ONLY)
Dept: URBAN - METROPOLITAN AREA CLINIC 137 | Facility: CLINIC | Age: 78
Setting detail: DERMATOLOGY
End: 2023-11-13

## 2023-11-13 PROBLEM — D04.71 CARCINOMA IN SITU OF SKIN OF RIGHT LOWER LIMB, INCLUDING HIP: Status: ACTIVE | Noted: 2023-11-13

## 2023-11-13 PROCEDURE — ? COUNSELING

## 2023-11-13 PROCEDURE — ? CURETTAGE AND DESTRUCTION

## 2023-11-13 PROCEDURE — 17262 DSTRJ MAL LES T/A/L 1.1-2.0: CPT

## 2023-11-13 NOTE — PROCEDURE: CURETTAGE AND DESTRUCTION
Detail Level: Detailed
Number Of Curettages: 3
Size Of Lesion In Cm: 0.7
Size Of Lesion After Curettage: 1.2
Add Intralesional Injection: No
Total Volume (Ccs): 1
Anesthesia Type: 1% lidocaine with epinephrine
Cautery Type: electrodesiccation
What Was Performed First?: Curettage
Final Size Statement: The size of the lesion after curettage was
Additional Information: (Optional): The wound was cleaned, and a pressure dressing was applied. The patient received detailed post-op instructions.
Consent was obtained from the patient. The risks, benefits and alternatives to therapy were discussed in detail. Specifically, the risks of infection, scarring, bleeding, prolonged wound healing, nerve injury, incomplete removal, allergy to anesthesia and recurrence were addressed. Alternatives to [facility], such as: surgical removal and XRT were also discussed. Prior to the procedure, the treatment site was clearly identified and confirmed by the patient. All components of Universal Protocol/PAUSE Rule completed.
Post-Care Instructions: I reviewed with the patient in detail post-care instructions. Patient is to keep the area dry for 48 hours, and not to engage in any swimming until the area is healed. Should the patient develop any fevers, chills, bleeding, severe pain patient will contact the office immediately.
Bill As A Line Item Or As Units: Line Item

## 2024-02-28 ENCOUNTER — APPOINTMENT (RX ONLY)
Dept: URBAN - METROPOLITAN AREA CLINIC 137 | Facility: CLINIC | Age: 79
Setting detail: DERMATOLOGY
End: 2024-02-28

## 2024-02-28 DIAGNOSIS — D49.2 NEOPLASM OF UNSPECIFIED BEHAVIOR OF BONE, SOFT TISSUE, AND SKIN: ICD-10-CM

## 2024-02-28 PROCEDURE — ? COUNSELING

## 2024-02-28 PROCEDURE — 11102 TANGNTL BX SKIN SINGLE LES: CPT

## 2024-02-28 PROCEDURE — ? BIOPSY BY SHAVE METHOD

## 2024-02-28 ASSESSMENT — LOCATION DETAILED DESCRIPTION DERM: LOCATION DETAILED: RIGHT LATERAL CANTHUS

## 2024-02-28 ASSESSMENT — LOCATION ZONE DERM: LOCATION ZONE: EYELID

## 2024-02-28 ASSESSMENT — LOCATION SIMPLE DESCRIPTION DERM: LOCATION SIMPLE: RIGHT EYELID

## 2024-02-28 NOTE — PROCEDURE: BIOPSY BY SHAVE METHOD
Detail Level: Detailed
Depth Of Biopsy: dermis
Was A Bandage Applied: Yes
Size Of Lesion In Cm: 0
Biopsy Type: H and E
Biopsy Method: Dermablade
Anesthesia Type: 1% lidocaine with epinephrine
Anesthesia Volume In Cc: 0.5
Hemostasis: Monsel's and Electrocautery
Wound Care: Petrolatum
Dressing: Band-Aid
Destruction After The Procedure: No
Type Of Destruction Used: Curettage
Curettage Text: The wound bed was treated with curettage after the biopsy was performed.
Cryotherapy Text: The wound bed was treated with cryotherapy after the biopsy was performed.
Electrodesiccation Text: The wound bed was treated with electrodesiccation after the biopsy was performed.
Electrodesiccation And Curettage Text: The wound bed was treated with electrodesiccation and curettage after the biopsy was performed.
Silver Nitrate Text: The wound bed was treated with silver nitrate after the biopsy was performed.
Lab: -[id number]
Consent: Written consent was obtained and risks were reviewed including but not limited to scarring, infection, bleeding, scabbing, incomplete removal, nerve damage and allergy to anesthesia.
Post-Care Instructions: I reviewed with the patient in detail post-care instructions. Patient is to keep the biopsy site dry overnight, and then apply bacitracin twice daily until healed. Patient may apply hydrogen peroxide soaks to remove any crusting.
Notification Instructions: Patient will be notified of biopsy results. However, patient instructed to call the office if not contacted within 2 weeks.
Billing Type: Third-Party Bill
Information: Selecting Yes will display possible errors in your note based on the variables you have selected. This validation is only offered as a suggestion for you. PLEASE NOTE THAT THE VALIDATION TEXT WILL BE REMOVED WHEN YOU FINALIZE YOUR NOTE. IF YOU WANT TO FAX A PRELIMINARY NOTE YOU WILL NEED TO TOGGLE THIS TO 'NO' IF YOU DO NOT WANT IT IN YOUR FAXED NOTE.

## 2024-09-11 ENCOUNTER — APPOINTMENT (RX ONLY)
Dept: URBAN - METROPOLITAN AREA CLINIC 137 | Facility: CLINIC | Age: 79
Setting detail: DERMATOLOGY
End: 2024-09-11

## 2024-09-11 DIAGNOSIS — L57.0 ACTINIC KERATOSIS: ICD-10-CM

## 2024-09-11 DIAGNOSIS — D18.0 HEMANGIOMA: ICD-10-CM | Status: UNCHANGED

## 2024-09-11 DIAGNOSIS — L57.8 OTHER SKIN CHANGES DUE TO CHRONIC EXPOSURE TO NONIONIZING RADIATION: ICD-10-CM | Status: UNCHANGED

## 2024-09-11 DIAGNOSIS — D49.2 NEOPLASM OF UNSPECIFIED BEHAVIOR OF BONE, SOFT TISSUE, AND SKIN: ICD-10-CM

## 2024-09-11 DIAGNOSIS — T07XXXA INSECT BITE, NONVENOMOUS, OF OTHER, MULTIPLE, AND UNSPECIFIED SITES, WITHOUT MENTION OF INFECTION: ICD-10-CM | Status: RESOLVING

## 2024-09-11 DIAGNOSIS — L82.1 OTHER SEBORRHEIC KERATOSIS: ICD-10-CM | Status: UNCHANGED

## 2024-09-11 DIAGNOSIS — Z71.89 OTHER SPECIFIED COUNSELING: ICD-10-CM

## 2024-09-11 PROBLEM — D18.01 HEMANGIOMA OF SKIN AND SUBCUTANEOUS TISSUE: Status: ACTIVE | Noted: 2024-09-11

## 2024-09-11 PROBLEM — S40.261A INSECT BITE (NONVENOMOUS) OF RIGHT SHOULDER, INITIAL ENCOUNTER: Status: ACTIVE | Noted: 2024-09-11

## 2024-09-11 PROCEDURE — 99213 OFFICE O/P EST LOW 20 MIN: CPT | Mod: 25

## 2024-09-11 PROCEDURE — ? COUNSELING

## 2024-09-11 PROCEDURE — 11102 TANGNTL BX SKIN SINGLE LES: CPT

## 2024-09-11 PROCEDURE — ? BIOPSY BY SHAVE METHOD

## 2024-09-11 PROCEDURE — 17003 DESTRUCT PREMALG LES 2-14: CPT

## 2024-09-11 PROCEDURE — ? SUNSCREEN RECOMMENDATIONS

## 2024-09-11 PROCEDURE — 17000 DESTRUCT PREMALG LESION: CPT | Mod: 59

## 2024-09-11 PROCEDURE — ? LIQUID NITROGEN

## 2024-09-11 ASSESSMENT — LOCATION DETAILED DESCRIPTION DERM
LOCATION DETAILED: RIGHT SUPERIOR UPPER BACK
LOCATION DETAILED: RIGHT MEDIAL BREAST 3-4:00 REGION
LOCATION DETAILED: 2ND WEB SPACE RIGHT HAND
LOCATION DETAILED: PERIUMBILICAL SKIN
LOCATION DETAILED: LEFT SUPERIOR UPPER BACK
LOCATION DETAILED: RIGHT SUPERIOR LATERAL MIDBACK
LOCATION DETAILED: RIGHT RADIAL DORSAL HAND
LOCATION DETAILED: LEFT INFERIOR MEDIAL MIDBACK
LOCATION DETAILED: RIGHT INFERIOR LATERAL LOWER BACK
LOCATION DETAILED: LEFT SUPERIOR LATERAL LOWER BACK
LOCATION DETAILED: RIGHT ANTERIOR SHOULDER
LOCATION DETAILED: NASAL DORSUM
LOCATION DETAILED: EPIGASTRIC SKIN
LOCATION DETAILED: LEFT MEDIAL BREAST 6-7:00 REGION
LOCATION DETAILED: RIGHT INFERIOR ANTERIOR NECK
LOCATION DETAILED: 3RD WEB SPACE RIGHT HAND
LOCATION DETAILED: LEFT LATERAL CANTHUS
LOCATION DETAILED: INFERIOR THORACIC SPINE

## 2024-09-11 ASSESSMENT — LOCATION SIMPLE DESCRIPTION DERM
LOCATION SIMPLE: LEFT EYELID
LOCATION SIMPLE: RIGHT HAND
LOCATION SIMPLE: LEFT UPPER BACK
LOCATION SIMPLE: NOSE
LOCATION SIMPLE: RIGHT UPPER BACK
LOCATION SIMPLE: RIGHT SHOULDER
LOCATION SIMPLE: RIGHT BREAST
LOCATION SIMPLE: LEFT LOWER BACK
LOCATION SIMPLE: UPPER BACK
LOCATION SIMPLE: ABDOMEN
LOCATION SIMPLE: RIGHT ANTERIOR NECK
LOCATION SIMPLE: RIGHT LOWER BACK
LOCATION SIMPLE: LEFT BREAST

## 2024-09-11 ASSESSMENT — LOCATION ZONE DERM
LOCATION ZONE: NOSE
LOCATION ZONE: EYELID
LOCATION ZONE: HAND
LOCATION ZONE: NECK
LOCATION ZONE: ARM
LOCATION ZONE: TRUNK

## 2024-09-11 NOTE — PROCEDURE: BIOPSY BY SHAVE METHOD
Detail Level: Detailed
Depth Of Biopsy: dermis
Was A Bandage Applied: Yes
Size Of Lesion In Cm: 0
Biopsy Type: H and E
Biopsy Method: Personna blade
Anesthesia Type: 1% lidocaine with epinephrine and a 1:10 solution of 8.4% sodium bicarbonate
Anesthesia Volume In Cc: 0.5
Hemostasis: Electrocautery
Wound Care: Aquaphor
Dressing: Band-Aid
Destruction After The Procedure: No
Type Of Destruction Used: Curettage
Curettage Text: The wound bed was treated with curettage after the biopsy was performed.
Cryotherapy Text: The wound bed was treated with cryotherapy after the biopsy was performed.
Electrodesiccation Text: The wound bed was treated with electrodesiccation after the biopsy was performed.
Electrodesiccation And Curettage Text: The wound bed was treated with electrodesiccation and curettage after the biopsy was performed.
Silver Nitrate Text: The wound bed was treated with silver nitrate after the biopsy was performed.
Lab: -[id number]
Consent: Written consent was obtained and risks were reviewed including but not limited to scarring, infection, bleeding, scabbing, incomplete removal, nerve damage and allergy to anesthesia.
Post-Care Instructions: I reviewed with the patient in detail post-care instructions. Patient is to keep the biopsy site dry overnight, and then apply bacitracin twice daily until healed. Patient may apply hydrogen peroxide soaks to remove any crusting.
Notification Instructions: Patient will be notified of biopsy results. However, patient instructed to call the office if not contacted within 2 weeks.
Billing Type: Third-Party Bill
Information: Selecting Yes will display possible errors in your note based on the variables you have selected. This validation is only offered as a suggestion for you. PLEASE NOTE THAT THE VALIDATION TEXT WILL BE REMOVED WHEN YOU FINALIZE YOUR NOTE. IF YOU WANT TO FAX A PRELIMINARY NOTE YOU WILL NEED TO TOGGLE THIS TO 'NO' IF YOU DO NOT WANT IT IN YOUR FAXED NOTE.

## 2024-09-11 NOTE — PROCEDURE: LIQUID NITROGEN
Consent: The patient's consent was obtained including but not limited to risks of crusting, scabbing, blistering, scarring, darker or lighter pigmentary change, recurrence, incomplete removal and infection.
Show Aperture Variable?: Yes
Application Tool (Optional): Liquid Nitrogen Sprayer
Post-Care Instructions: I reviewed with the patient in detail post-care instructions. Patient is to wear sunprotection, and avoid picking at any of the treated lesions. Pt may apply Vaseline to crusted or scabbing areas.
Duration Of Freeze Thaw-Cycle (Seconds): 1
Number Of Freeze-Thaw Cycles: 2 freeze-thaw cycles
Render Post-Care Instructions In Note?: no
Detail Level: Detailed

## 2024-11-11 ENCOUNTER — COMPREHENSIVE EXAM (OUTPATIENT)
Dept: URBAN - METROPOLITAN AREA CLINIC 43 | Facility: CLINIC | Age: 79
End: 2024-11-11

## 2024-11-11 DIAGNOSIS — H04.123: ICD-10-CM

## 2024-11-11 DIAGNOSIS — H18.593: ICD-10-CM

## 2024-11-11 DIAGNOSIS — H35.413: ICD-10-CM

## 2024-11-11 DIAGNOSIS — H53.10: ICD-10-CM

## 2024-11-11 DIAGNOSIS — H18.513: ICD-10-CM

## 2024-11-11 DIAGNOSIS — H43.813: ICD-10-CM

## 2024-11-11 DIAGNOSIS — Z96.1: ICD-10-CM

## 2024-11-11 DIAGNOSIS — H26.492: ICD-10-CM

## 2024-11-11 PROCEDURE — 92014 COMPRE OPH EXAM EST PT 1/>: CPT

## 2024-11-11 PROCEDURE — 92015 DETERMINE REFRACTIVE STATE: CPT
